# Patient Record
Sex: FEMALE | Race: OTHER | URBAN - NONMETROPOLITAN AREA
[De-identification: names, ages, dates, MRNs, and addresses within clinical notes are randomized per-mention and may not be internally consistent; named-entity substitution may affect disease eponyms.]

---

## 2023-10-30 ENCOUNTER — HOSPITAL ENCOUNTER (EMERGENCY)
Age: 38
Discharge: HOME | End: 2023-10-30
Attending: STUDENT IN AN ORGANIZED HEALTH CARE EDUCATION/TRAINING PROGRAM
Payer: MEDICAID

## 2023-10-30 VITALS
OXYGEN SATURATION: 98 % | SYSTOLIC BLOOD PRESSURE: 132 MMHG | RESPIRATION RATE: 19 BRPM | HEART RATE: 99 BPM | DIASTOLIC BLOOD PRESSURE: 78 MMHG

## 2023-10-30 DIAGNOSIS — K04.7 DENTAL INFECTION: Primary | ICD-10-CM

## 2023-10-30 PROCEDURE — 99283 EMERGENCY DEPT VISIT LOW MDM: CPT

## 2023-10-30 PROCEDURE — 6370000000 HC RX 637 (ALT 250 FOR IP): Performed by: STUDENT IN AN ORGANIZED HEALTH CARE EDUCATION/TRAINING PROGRAM

## 2023-10-30 RX ORDER — AMOXICILLIN 500 MG/1
500 CAPSULE ORAL 2 TIMES DAILY
Qty: 20 CAPSULE | Refills: 0 | Status: SHIPPED | OUTPATIENT
Start: 2023-10-30 | End: 2023-11-09

## 2023-10-30 RX ORDER — AMOXICILLIN 250 MG/1
500 CAPSULE ORAL ONCE
Status: COMPLETED | OUTPATIENT
Start: 2023-10-30 | End: 2023-10-30

## 2023-10-30 RX ORDER — ACETAMINOPHEN 500 MG
1000 TABLET ORAL ONCE
Status: COMPLETED | OUTPATIENT
Start: 2023-10-30 | End: 2023-10-30

## 2023-10-30 RX ADMIN — AMOXICILLIN 500 MG: 250 CAPSULE ORAL at 21:24

## 2023-10-30 RX ADMIN — ACETAMINOPHEN 1000 MG: 500 TABLET ORAL at 21:24

## 2023-10-31 NOTE — ED PROVIDER NOTES
acetaminophen (TYLENOL) tablet 1,000 mg (has no administration in time range)   amoxicillin (AMOXIL) capsule 500 mg (has no administration in time range)            Procedures: (None if left blank)  Procedures:     Consultants:  None    Documentation:  N/A    MEDICATION CHANGES     New Prescriptions    AMOXICILLIN (AMOXIL) 500 MG CAPSULE    Take 1 capsule by mouth 2 times daily for 10 days       FINAL IMPRESSION     Final diagnoses:   Dental infection       DISPOSITION   DISPOSITION Discharge - Pending Orders Complete 10/30/2023 09:02:02 PM      Results and plan discussed with patient at bedside. Patient is agreeable to plan. Outpatient Follow-Up:  Dentist - See attached list              This transcription was electronically signed. Parts of this transcriptions may have been dictated by use of voice recognition software and electronically transcribed. The transcription may contain errors not detected in proofreading. Please refer to my supervising physician's documentation if my documentation differs.     Electronically Signed: Jared Conklin MD, 10/30/23, 9:10 PM         Bob Lopez MD  Resident  10/30/23 [id number]

## 2023-10-31 NOTE — DISCHARGE INSTRUCTIONS
Take antibiotics as prescribed. Antibiotics should be free at [facility], so I sent them there. Go to Dentist as soon as possible, see list attached.

## 2023-10-31 NOTE — ED NOTES
Physician at bedside with . Pt admits dental pain with blood and pus x4 days ago accompanied with rhinitis, \"fever every night and under my neck\". Denies history, nausea, abdominal pain, diarrhea, chest pain, and headache.       Viktor Isaac LPN  56/75/23 029

## 2023-11-02 NOTE — PROGRESS NOTES
[facility] [address] [place] [id number]  Dept: [phone]  Dept Fax: [phone]  Loc: [phone]      Kalia Salcedo is a 45 y.o. female who presents todayfor her medical conditions/complaints as noted below. Kalia Salcedo is c/o of New Patient (Dental pains in ER [id number]. Tooth swelling and discharge for over a week now./Wants to discuss contraceptions and to see a dentist )      :     Patient is here for ED follow-up. Patient was seen in our ED on 10/30/2023. Patient came in for evaluation of mouth pain for 4 days. Was also wondering about birth control at this time. Was given Amoxicillin 500 BID for 10 days. Today:  Patient states dental pain has improved, still notices some slight discharge however not as much as prior. States that she has no fevers or chills. Pain is improved with use of Tylenol and ibuprofen. Is continuing to use antibiotic prescribed by the emergency department. No other acute concerns involving her with dental pain, states she would like to be seen by dentist.    Also wanted to discuss contraception, states she is always had regular periods and denies any dysmenorrhea. Discussed with her that I will be unable to prescribe contraceptive medication purely for this reason and she will have to be seen by OB/GYN and she understood. There is no problem list on file for this patient. Goals    None       The patient has No Known Allergies. Medical History  Kenny Pierce has no past medical history on file. Past SurgicalHistory  The patient  has no past surgical history on file. Family History  This patient's family history is not on file. Social History  Kenny Pierce  reports that she has never smoked. She has never used smokeless tobacco. She reports that she does not drink alcohol.     Medications    Current Outpatient Medications:     amoxicillin (AMOXIL) 500 MG capsule, Take 1

## 2023-11-03 ENCOUNTER — OFFICE VISIT (OUTPATIENT)
Dept: FAMILY MEDICINE CLINIC | Age: 38
End: 2023-11-03

## 2023-11-03 VITALS
BODY MASS INDEX: 31.48 KG/M2 | HEIGHT: 64 IN | SYSTOLIC BLOOD PRESSURE: 118 MMHG | OXYGEN SATURATION: 99 % | RESPIRATION RATE: 18 BRPM | DIASTOLIC BLOOD PRESSURE: 78 MMHG | HEART RATE: 80 BPM | WEIGHT: 184.4 LBS | TEMPERATURE: 98.4 F

## 2023-11-03 DIAGNOSIS — Z30.09 COUNSELING FOR BIRTH CONTROL, ORAL CONTRACEPTIVES: ICD-10-CM

## 2023-11-03 DIAGNOSIS — K08.89 PAIN, DENTAL: Primary | ICD-10-CM

## 2023-11-03 SDOH — ECONOMIC STABILITY: FOOD INSECURITY: WITHIN THE PAST 12 MONTHS, YOU WORRIED THAT YOUR FOOD WOULD RUN OUT BEFORE YOU GOT MONEY TO BUY MORE.: NEVER TRUE

## 2023-11-03 SDOH — ECONOMIC STABILITY: FOOD INSECURITY: WITHIN THE PAST 12 MONTHS, THE FOOD YOU BOUGHT JUST DIDN'T LAST AND YOU DIDN'T HAVE MONEY TO GET MORE.: NEVER TRUE

## 2023-11-03 SDOH — ECONOMIC STABILITY: HOUSING INSECURITY
IN THE LAST 12 MONTHS, WAS THERE A TIME WHEN YOU DID NOT HAVE A STEADY PLACE TO SLEEP OR SLEPT IN A SHELTER (INCLUDING NOW)?: NO

## 2023-11-03 SDOH — ECONOMIC STABILITY: INCOME INSECURITY: HOW HARD IS IT FOR YOU TO PAY FOR THE VERY BASICS LIKE FOOD, HOUSING, MEDICAL CARE, AND HEATING?: HARD

## 2023-11-03 ASSESSMENT — PATIENT HEALTH QUESTIONNAIRE - PHQ9
SUM OF ALL RESPONSES TO PHQ QUESTIONS 1-9: 0
1. LITTLE INTEREST OR PLEASURE IN DOING THINGS: 0
SUM OF ALL RESPONSES TO PHQ QUESTIONS 1-9: 0
SUM OF ALL RESPONSES TO PHQ9 QUESTIONS 1 & 2: 0
SUM OF ALL RESPONSES TO PHQ QUESTIONS 1-9: 0
2. FEELING DOWN, DEPRESSED OR HOPELESS: 0
SUM OF ALL RESPONSES TO PHQ QUESTIONS 1-9: 0

## 2023-11-03 ASSESSMENT — ENCOUNTER SYMPTOMS
COUGH: 0
ABDOMINAL PAIN: 0

## 2023-11-16 ENCOUNTER — CARE COORDINATION (OUTPATIENT)
Dept: CARE COORDINATION | Age: 38
End: 2023-11-16

## 2023-11-16 NOTE — CARE COORDINATION
SW attempted to call  services was on hold 4 minutes and no one was available. Will try again later.

## 2023-11-16 NOTE — CARE COORDINATION
SW called [facility] an dspoke with [id number] Femi Patterson). Provided him with needed information and consent to leave vm if no answer from pt. Femi Patterson provided pt through vm the recommendation to [facility] for her Dental and Contraceptive needs. Provided  Address: [address] and Ph: [phone].